# Patient Record
(demographics unavailable — no encounter records)

---

## 2025-05-02 NOTE — CARDIOLOGY SUMMARY
[de-identified] : 5/2/2025, NSR 5/10/2024, NSR with sinus arrhythmia [de-identified] : 2024, CT Heart for Calcium Scorin

## 2025-05-02 NOTE — DISCUSSION/SUMMARY
[FreeTextEntry1] : 1. Familial Hypercholesterolemia: reviewed labs with patient from 9/2022 and 11/2023. LDL significant elevated on both labs. Reviewed labs with patient from 4/2025, where LDL was 212. CT Heart for Calcium Scoring was 0, 5/20/2024. Discussed benefits of statin therapy and it's continued indication. Patient recently started on simvastatin 20mg nightly by PCP. She will see her PCP in a few months for  recheck of lipids. Also wants to try "natural supplements," however, discussed the limited utility of the products.  Follow up in 1 year. [EKG obtained to assist in diagnosis and management of assessed problem(s)] : EKG obtained to assist in diagnosis and management of assessed problem(s)

## 2025-05-02 NOTE — PHYSICAL EXAM
[Normal] : moves all extremities, no focal deficits, normal speech [de-identified] :  No carotid bruits auscultated bilaterally.

## 2025-05-02 NOTE — HISTORY OF PRESENT ILLNESS
[FreeTextEntry1] : Historical Perspective: 61 year old female with PMHx of familial HLD presents for a cardiac evaluation. Brother with CAD s/p PCI at age 58 years old. Patient has no chest pain, dyspnea or palpitations.  There is no history of MI, CVA, CHF, or previous coronary intervention.  Current Health Status: Patient's LDL was 212 on labs from 4/2025. Patient now taking simvastatin 20mg nightly, however, also wants to try supplements. No chest pain, dyspnea or palpitations. Eating healthy and exercising.